# Patient Record
Sex: FEMALE | Race: BLACK OR AFRICAN AMERICAN | Employment: OTHER | URBAN - METROPOLITAN AREA
[De-identification: names, ages, dates, MRNs, and addresses within clinical notes are randomized per-mention and may not be internally consistent; named-entity substitution may affect disease eponyms.]

---

## 2019-10-07 ENCOUNTER — APPOINTMENT (OUTPATIENT)
Dept: CT IMAGING | Facility: HOSPITAL | Age: 64
DRG: 065 | End: 2019-10-07
Attending: EMERGENCY MEDICINE
Payer: MEDICARE

## 2019-10-07 ENCOUNTER — APPOINTMENT (OUTPATIENT)
Dept: MRI IMAGING | Facility: HOSPITAL | Age: 64
DRG: 065 | End: 2019-10-07
Attending: EMERGENCY MEDICINE
Payer: MEDICARE

## 2019-10-07 ENCOUNTER — HOSPITAL ENCOUNTER (INPATIENT)
Facility: HOSPITAL | Age: 64
LOS: 3 days | Discharge: NURSING, REHAB OR LONG-TERM CARE | DRG: 065 | End: 2019-10-10
Attending: EMERGENCY MEDICINE | Admitting: INTERNAL MEDICINE
Payer: MEDICARE

## 2019-10-07 DIAGNOSIS — I63.20 CEREBROVASCULAR ACCIDENT (CVA) DUE TO OCCLUSION OF PRECEREBRAL ARTERY (HCC): Primary | ICD-10-CM

## 2019-10-07 PROCEDURE — 70450 CT HEAD/BRAIN W/O DYE: CPT | Performed by: EMERGENCY MEDICINE

## 2019-10-07 PROCEDURE — 70551 MRI BRAIN STEM W/O DYE: CPT | Performed by: EMERGENCY MEDICINE

## 2019-10-07 RX ORDER — FUROSEMIDE 40 MG/1
40 TABLET ORAL DAILY
Status: DISCONTINUED | OUTPATIENT
Start: 2019-10-08 | End: 2019-10-10

## 2019-10-07 RX ORDER — DEXTROSE MONOHYDRATE 25 G/50ML
50 INJECTION, SOLUTION INTRAVENOUS AS NEEDED
Status: DISCONTINUED | OUTPATIENT
Start: 2019-10-07 | End: 2019-10-10

## 2019-10-07 RX ORDER — FUROSEMIDE 40 MG/1
40 TABLET ORAL DAILY
COMMUNITY

## 2019-10-07 RX ORDER — ERGOCALCIFEROL 1.25 MG/1
50000 CAPSULE ORAL WEEKLY
Status: DISCONTINUED | OUTPATIENT
Start: 2019-10-08 | End: 2019-10-10

## 2019-10-07 RX ORDER — ALPRAZOLAM 1 MG/1
1 TABLET ORAL NIGHTLY PRN
Status: DISCONTINUED | OUTPATIENT
Start: 2019-10-07 | End: 2019-10-10

## 2019-10-07 RX ORDER — ASPIRIN 81 MG/1
324 TABLET, CHEWABLE ORAL ONCE
Status: COMPLETED | OUTPATIENT
Start: 2019-10-07 | End: 2019-10-07

## 2019-10-07 RX ORDER — ALPRAZOLAM 1 MG/1
1 TABLET ORAL NIGHTLY PRN
COMMUNITY

## 2019-10-07 RX ORDER — CHOLECALCIFEROL (VITAMIN D3) 1250 MCG
1 CAPSULE ORAL WEEKLY
Status: ON HOLD | COMMUNITY
End: 2019-10-07 | Stop reason: CLARIF

## 2019-10-07 RX ORDER — AMLODIPINE BESYLATE 10 MG/1
10 TABLET ORAL DAILY
Status: DISCONTINUED | OUTPATIENT
Start: 2019-10-07 | End: 2019-10-10

## 2019-10-07 RX ORDER — ISOSORBIDE MONONITRATE 30 MG/1
90 TABLET, EXTENDED RELEASE ORAL DAILY
COMMUNITY

## 2019-10-07 RX ORDER — ERGOCALCIFEROL 1.25 MG/1
50000 CAPSULE ORAL WEEKLY
COMMUNITY

## 2019-10-07 RX ORDER — CARVEDILOL 25 MG/1
25 TABLET ORAL DAILY
Status: ON HOLD | COMMUNITY
End: 2019-10-10

## 2019-10-07 RX ORDER — LOSARTAN POTASSIUM 100 MG/1
TABLET ORAL DAILY
COMMUNITY

## 2019-10-07 RX ORDER — TRAMADOL HYDROCHLORIDE 50 MG/1
50 TABLET ORAL EVERY 8 HOURS PRN
Status: DISCONTINUED | OUTPATIENT
Start: 2019-10-07 | End: 2019-10-10

## 2019-10-07 RX ORDER — TRAMADOL HYDROCHLORIDE 50 MG/1
50 TABLET ORAL EVERY 8 HOURS PRN
COMMUNITY

## 2019-10-07 RX ORDER — LOSARTAN POTASSIUM 100 MG/1
100 TABLET ORAL DAILY
Status: DISCONTINUED | OUTPATIENT
Start: 2019-10-07 | End: 2019-10-10

## 2019-10-07 RX ORDER — AMLODIPINE BESYLATE 10 MG/1
10 TABLET ORAL DAILY
COMMUNITY

## 2019-10-07 RX ORDER — ASPIRIN 81 MG/1
81 TABLET, CHEWABLE ORAL DAILY
Status: DISCONTINUED | OUTPATIENT
Start: 2019-10-08 | End: 2019-10-10

## 2019-10-07 RX ORDER — CARVEDILOL 25 MG/1
25 TABLET ORAL DAILY
Status: DISCONTINUED | OUTPATIENT
Start: 2019-10-07 | End: 2019-10-10

## 2019-10-07 NOTE — ED INITIAL ASSESSMENT (HPI)
The patient reports that starting Friday she has had right arm pain, lower lip and tongue numbness, and left-sided weakness.

## 2019-10-08 ENCOUNTER — APPOINTMENT (OUTPATIENT)
Dept: CV DIAGNOSTICS | Facility: HOSPITAL | Age: 64
DRG: 065 | End: 2019-10-08
Attending: Other
Payer: MEDICARE

## 2019-10-08 ENCOUNTER — APPOINTMENT (OUTPATIENT)
Dept: ULTRASOUND IMAGING | Facility: HOSPITAL | Age: 64
DRG: 065 | End: 2019-10-08
Attending: Other
Payer: MEDICARE

## 2019-10-08 PROCEDURE — 93306 TTE W/DOPPLER COMPLETE: CPT | Performed by: OTHER

## 2019-10-08 PROCEDURE — 99223 1ST HOSP IP/OBS HIGH 75: CPT | Performed by: OTHER

## 2019-10-08 PROCEDURE — 93880 EXTRACRANIAL BILAT STUDY: CPT | Performed by: OTHER

## 2019-10-08 RX ORDER — ATORVASTATIN CALCIUM 20 MG/1
20 TABLET, FILM COATED ORAL NIGHTLY
Status: DISCONTINUED | OUTPATIENT
Start: 2019-10-08 | End: 2019-10-10

## 2019-10-08 RX ORDER — ATORVASTATIN CALCIUM 10 MG/1
10 TABLET, FILM COATED ORAL NIGHTLY
Status: DISCONTINUED | OUTPATIENT
Start: 2019-10-08 | End: 2019-10-08

## 2019-10-08 NOTE — ED PROVIDER NOTES
Patient Seen in: [facility] AND [facility] Emergency Department    History   Patient presents with:  Numbness Weakness (neurologic)    Stated Complaint: inability to ambulate, left arm pain and lower lip numbness for 3 days    HPI    Patient complains of r facia EYES: PERRLA, EOMI,  THROAT: mm dry, no lesions  NECK: supple, no meningeal signs  LUNGS: no resp distress, cta bilateral  CARDIO: RRR without murmur  GI: abdomen is soft and non tender, no masses, nl bowel sounds   EXTREMITIES: trace pedal edema,   NEUR changes           MDM   NIH Stroke Scale: 5    [email or web address].nl    TPA Discussion:  Not tpa candidate symptoms for 3 days      MDM       Admission disposition: 10/7/2019  9:36 PM         STROKE ALERT NOT [address] Redemonstration of an empty sella.    Dictated by (CST): Rhonda Jaime MD on 10/07/2019 at 21:12     Approved by (CST): Rhonda Jaime MD on 10/07/2019 at 21:16            Disposition and Plan     Clinical Impression:  Cerebrovascular accident (CVA

## 2019-10-08 NOTE — SLP NOTE
ADULT SWALLOWING EVALUATION    ASSESSMENT    ASSESSMENT/OVERALL IMPRESSION:      This BSE was ordered d/t stroke protocol. Pt alert, on 3 L/min 02 NC, afebrile and assessed sitting upright in chair (after consulting with RN).  Upon entrance to room, N Recommendations - Solids: Regular  Diet Recommendations - Liquid: Thin      Compensatory Strategies Recommended: No straws;Small bites and sips  Aspiration Precautions: Upright position; Slow rate;Small bites and sips; No straw  Medication Administration Rec cannula  Consistencies Trialed: Thin liquids; Hard solid  Method of Presentation: Self presentation;Cup;Straw  Patient Positioning: Upright;Midline;Standard chair    Oral Phase of Swallow:  Within Functional Limits       Pharyngeal Phase of Swallow: Impaired

## 2019-10-08 NOTE — CONSULTS
[facility]    PATIENT'S NAME: [facility]   ATTENDING PHYSICIAN: Santosh Chiu MD   CONSULTING PHYSICIAN: Williams Cosme MD   PATIENT ACCOUNT#:   [id number]    LOCATION:  3[facility] [address] RECORD #:   [id number]       DATE OF BIRTH: ankles. LABORATORY DATA:  Labs reveal . In the emergency room, she had a CT of the brain, revealing left thalamic ischemic stroke, new from 2015. Subsequently MRI of the brain:  Subacute left thalamic stroke.   Carotid ultrasounds:  No hemod

## 2019-10-08 NOTE — PHYSICAL THERAPY NOTE
PHYSICAL THERAPY EVALUATION - INPATIENT     Room Number: 306/306-A  Evaluation Date: 10/8/2019  Type of Evaluation: Initial   Physician Order: PT Eval and Treat    Presenting Problem: CVA  Reason for Therapy: Mobility Dysfunction and Discharge Planning weakness. Assist with management of RW with turns. 3 shuffled steps total.   Patient in chair at end of session with needs in reach. *Spoke to RN, Griffin Santos about ordering a Knee immobilizer to prevent RLE buckling in future sessions.      The patient's Appro douglas\"    PHYSICAL THERAPY EXAMINATION     OBJECTIVE  Precautions: Bed/chair alarm  Fall Risk: High fall risk    WEIGHT BEARING RESTRICTION  Weight Bearing Restriction: None                PAIN ASSESSMENT  Rating: Unable to rate  Location: R groin  Manage wheelchair)?: A Lot   -   Need to walk in hospital room?: A Lot   -   Climbing 3-5 steps with a railing?: Total     AM-PAC Score:  Raw Score: 12   Approx Degree of Impairment: 68.66%   Standardized Score (AM-PAC Scale): 35.33   CMS Modifier (G-Code): CL

## 2019-10-08 NOTE — CM/SW NOTE
09: 10AM  SW received MDO for social support and home eval.    SW spoke w/ pt in pt's room. Pt reports living w/ her dtr, [facility], and her grandson. Pt states they live in a 2 level home w/ 4-5 stairs to get into the home.  Pt reports that she remains on the

## 2019-10-08 NOTE — PLAN OF CARE
Problem: Patient Centered Care  Goal: Patient preferences are identified and integrated in the patient's plan of care  Description  Interventions:  - What would you like us to know as we care for you?   - Provide timely, complete, and accurate informatio slept well.

## 2019-10-08 NOTE — PLAN OF CARE
Problem: Patient Centered Care  Goal: Patient preferences are identified and integrated in the patient's plan of care  Description  Interventions:  - What would you like us to know as we care for you?  Home with family  - Provide timely, complete, and acc

## 2019-10-08 NOTE — CONSULTS
Spoke with the Dr. Diandra Talbert in the emergency room. Based on time she is not a TPA candidate. Based on his clinical findings she is not a neuroradiology intervention candidate. Dr. Diandra Talbert gave her aspirin. Will order stroke order sets.   We will evaluate the p

## 2019-10-08 NOTE — OCCUPATIONAL THERAPY NOTE
OCCUPATIONAL THERAPY EVALUATION - INPATIENT     Room Number: 306/306-A  Evaluation Date: 10/8/2019  Type of Evaluation: Initial  Presenting Problem: RT weakness, slurred speech    Physician Order: IP Consult to Occupational Therapy  Reason for Therapy: ADL supports that patients with this level of impairment may benefit from [address]  (nurse and social worked contacted and aware of recommendations).      DISCHARGE RECOMMENDATIONS  OT Discharge Recommendations: Acute rehabilitation  OT Device Recommendations RA  No c/o of fatigue but rather generalized weakness    O2 SATURATIONS  SPO2 on Room Air at Rest: (97 (nc out of place upon arrival- RN aware))             COGNITION  Alert and O  Follows multiple step commands    VISION  No changes    SENSATION  Continue Comment:      Comment:   Pt will perform RT UE HEP addressing strength/ROM as well as [address] and [address] [facility] for improved performace with ADLs following written in struction  Comment:    Pt will perform LE dressing with mod A 1x      Goals  on: 10/22/19  Adela Larson

## 2019-10-08 NOTE — ED NOTES
Pt reports right sided weakness since Thursday. Daughter encouraged the patient to come to ED today. Pt has slurred speech and weakness to right leg and right arm.

## 2019-10-08 NOTE — CONSULTS
.[address] Patient Status:  Inpatient    3/21/1955 MRN [id number]   Location [facility] 3W/SW Attending [address], [address] [facility] Day # 1 PCP Day Rojas MD     Patient Identification  Christina Alves is a 62 yea significant area of stenosis in the right or left internal carotid artery. Mild plaque formation bilaterally as discussed    Pending 2D Echo today. Seen by SLP. On Reg/thins.       Physiatry consult obtained now to assess pt's funtional status and make a Intramuscular Prior to discharge       Social History    Tobacco Use      Smoking status: Former Smoker        Quit date:         Years since quittin.7      Smokeless tobacco: Never Used      Tobacco comment: 0.5 pack/day for several years    [place] Musculoskeletal:     Right Upper Extremity:  Strength is 2-3. ROM WNL. Left Upper Extremity:  Strength is 5. ROM WNL. Right Lower Extremity:  Strength  is 2. ROM WNL. Left Lower Extremity: Strength  is 5. ROM WNL.           Neuro: CNII-XII are g

## 2019-10-09 ENCOUNTER — APPOINTMENT (OUTPATIENT)
Dept: CT IMAGING | Facility: HOSPITAL | Age: 64
DRG: 065 | End: 2019-10-09
Attending: Other
Payer: MEDICARE

## 2019-10-09 PROBLEM — E66.01 MORBID OBESITY (HCC): Status: ACTIVE | Noted: 2019-10-09

## 2019-10-09 PROCEDURE — 70450 CT HEAD/BRAIN W/O DYE: CPT | Performed by: OTHER

## 2019-10-09 PROCEDURE — 99233 SBSQ HOSP IP/OBS HIGH 50: CPT | Performed by: OTHER

## 2019-10-09 RX ORDER — CHOLECALCIFEROL (VITAMIN D3) 1250 MCG
1 CAPSULE ORAL WEEKLY
Qty: 1 CAPSULE | Refills: 0 | Status: SHIPPED | OUTPATIENT
Start: 2019-10-09

## 2019-10-09 RX ORDER — ASPIRIN 81 MG/1
81 TABLET, CHEWABLE ORAL DAILY
Qty: 30 TABLET | Refills: 0 | Status: SHIPPED | OUTPATIENT
Start: 2019-10-10

## 2019-10-09 RX ORDER — ATORVASTATIN CALCIUM 20 MG/1
20 TABLET, FILM COATED ORAL NIGHTLY
Qty: 30 TABLET | Refills: 0 | Status: SHIPPED | OUTPATIENT
Start: 2019-10-09

## 2019-10-09 NOTE — CDS QUERY
(Please see Dr [address] PN of 10/9 7:58 pm file time for query response)   Potential for Imbalanced Nutrition  Ar [place]    Dear Doctor [address]:  BMI  40.27   In your H&P: Type 2 diabetes mellitus, noncompliant with d

## 2019-10-09 NOTE — H&P
[facility]    PATIENT'S NAME: Terrance Srinivasan   ATTENDING PHYSICIAN: Jayme Hoyos MD   PATIENT ACCOUNT#:   [id number]    LOCATION:  [address] [place] [address] [address] RECORD #:   [id number]       YOB: 1955  ADMISSION DATE:       10/07/201 side is normal.  No carotid bruit. Has right-sided weakness with right Babinski sign. LABORATORY DATA:  Labs were reviewed. Hemoglobin A1c 8.3. Cholesterol 187, . CT of the brain showed left thalamic stroke.       MRI of the brain showed

## 2019-10-09 NOTE — DIETARY NOTE
Patient and family educated regarding diabetes diet basics. Discussed carbohydrate foods, portion sizes, and food label reading. Handout given and initial meal plan provided. Encouraged to attend outpatient diabetes education. Questions answered.  Receptiv

## 2019-10-09 NOTE — PROGRESS NOTES
Patient reported to be tired and experiencing grief over not going to hospital sooner after experiencing stroke symptoms during the weekend.   provided spiritual and emotional support and encouragment through empathic listening, salome sharing, and p

## 2019-10-09 NOTE — CM/SW NOTE
Met with patient at the bedside, to Discuss the  BPCI/Medicare program. Patient agreed with phone follow up for 3 months from [facility] after discharge from [address] [place] [address]. Patient was enrolled under [facility] BPCI/Medicare letter and brochure provided.       ERASTO/MANI

## 2019-10-09 NOTE — OCCUPATIONAL THERAPY NOTE
OCCUPATIONAL THERAPY TREATMENT NOTE - INPATIENT        Room Number: 519/825-K           Presenting Problem: RT weakness, slurred speech    Problem List  Principal Problem:    Cerebrovascular accident (CVA) due to occlusion of precerebral artery ([facility] [address]  Acti taking off regular lower body clothing?: A Lot  -   Bathing (including washing, rinsing, drying)?: A Lot  -   Toileting, which includes using toilet, bedpan or urinal? : A Lot  -   Putting on and taking off regular upper body clothing?: A Little  -   [place]

## 2019-10-09 NOTE — SLP NOTE
SPEECH DAILY NOTE - INPATIENT    ASSESSMENT & PLAN   ASSESSMENT  Pt seen for dysphagia tx to assess tolerance of recommended diet, ensure adherence to aspiration precautions, and provide pt/family education. RN approved session.  Pt received sitting up in b of Visits to Meet Established Goals: 4    Session: 1    If you have any questions, please contact Alcon Villafuerte

## 2019-10-09 NOTE — CM/SW NOTE
09: 00AM  Received [facility]O for [facility]. ERASTO sent ref to [facility] via [employer]. ERASTO also received a call from [facility] - pt is appropriate for facility. Savanna Blank coming to speak w/ pt and pt's dtr this afternoon.     10:35AM  ERASTO contacte placing pt's admission on hold til tomorrow. ERASTO arranged ambulance transport to be on will call for tomorrow 10/10. ERASTO also updated pt's dtr - no answer, left Vmail. MANI Llanes updated pt's RN.     04:25PM  ERASTO/MANI picked up disc w/ pt images from me

## 2019-10-09 NOTE — PHYSICAL THERAPY NOTE
PHYSICAL THERAPY TREATMENT NOTE - INPATIENT     Room Number: 564/089-B       Presenting Problem: CVA    Problem List  Principal Problem:    Cerebrovascular accident (CVA) due to occlusion of precerebral artery ([address]  Active Problems:     Morbid obesity ([address] motion;Strengthening;Stair training;Transfer training;Balance training    SUBJECTIVE  \"I feel discouraged\"    OBJECTIVE  Precautions: Bed/chair alarm    WEIGHT BEARING RESTRICTION  Weight Bearing Restriction: None                PAIN ASSESSMENT   Rating: 10/22/19  Patient Goal Patient's self-stated goal is: Improve mobility   Goal #1 Patient is able to demonstrate supine - sit EOB @ level: minimum assistance      Goal #1   Current Status  CG assist    Goal #2 Patient is able to demonstrate transfers EOB to

## 2019-10-09 NOTE — PROGRESS NOTES
[facility]    Progress Note    Simin Bassem Patient Status:  Inpatient    3/21/1955 MRN [id number]   [facility] 3W/SW Attending [address], [address] Day # 2 PCP Franny Ya MD       Subjective:    Samantha Shine UNIT/ML flexpen 1-7 Units 1-7 Units Subcutaneous TID CC   influenza vaccine split quad (FLULAVAL) ages 6 months to 64 years inj 0.5ml 0.5 mL Intramuscular Prior to discharge         Results:     Lab Results   Component Value Date    WBC 8.1 10/09/2019    H 4. Redemonstration of an empty sella.    Dictated by (CST): David Sarmiento MD on 10/07/2019 at 21:12     Approved by (CST): David Sarmiento MD on 10/07/2019 at 21:16          Ekg 12-lead    Result Date: 10/7/2019  ECG Report  Interpretation  --------

## 2019-10-09 NOTE — PLAN OF CARE
Problem: Patient Centered Care  Goal: Patient preferences are identified and integrated in the patient's plan of care  Description  Interventions:  - What would you like us to know as we care for you?  Home with daughter  - Provide timely, complete, and a

## 2019-10-10 VITALS
DIASTOLIC BLOOD PRESSURE: 85 MMHG | RESPIRATION RATE: 19 BRPM | SYSTOLIC BLOOD PRESSURE: 155 MMHG | OXYGEN SATURATION: 98 % | HEART RATE: 71 BPM | HEIGHT: 67 IN | BODY MASS INDEX: 39.69 KG/M2 | WEIGHT: 252.88 LBS | TEMPERATURE: 98 F

## 2019-10-10 RX ORDER — CARVEDILOL 12.5 MG/1
12.5 TABLET ORAL 2 TIMES DAILY WITH MEALS
Qty: 60 TABLET | Refills: 0 | Status: SHIPPED | OUTPATIENT
Start: 2019-10-10

## 2019-10-10 RX ORDER — CARVEDILOL 12.5 MG/1
12.5 TABLET ORAL 2 TIMES DAILY WITH MEALS
Status: DISCONTINUED | OUTPATIENT
Start: 2019-10-10 | End: 2019-10-10

## 2019-10-10 NOTE — PROGRESS NOTES
[facility]    Progress Note    Famalok Andradecatarino Patient Status:  Inpatient    3/21/1955 MRN [id number]   [facility] 3W/ Attending [address], [address] Day # 2 PCP Selena Hassan MD       Subjective:    Kira Ortiz Insulin Aspart Pen (NOVOLOG) 100 UNIT/ML flexpen 1-7 Units 1-7 Units Subcutaneous TID CC   influenza vaccine split quad (FLULAVAL) ages 6 months to 64 years inj 0.5ml 0.5 mL Intramuscular Prior to discharge       Review of Systems:   GENERAL HEALTH: feel indeterminate lacunar infarct involving the left thalamus which appears new since prior MR of the brain in October, 2015. Follow-up brain MR would be of benefit to assess chronicity of this finding. 3. Stable partially empty sella turcica.  4. Peripherally

## 2019-10-10 NOTE — CM/SW NOTE
08: 15AM  Received MDO for [facility] aid. Pt is to d/c to [id number] [address] Acute rehab today. Pending receipt of all pt's images on disc from med records. 09:05AM  ERASTO checked pt's chart - additional disc w/ pt's CT not in pt's chart.     ERASTO contacted med rec and r

## 2019-10-10 NOTE — PROGRESS NOTES
Report given to LORETTA Monet ([phone]). All questions answered. Ambulance scheduled @1551. Belongings at the bedside. Family at the bedside.

## 2019-10-10 NOTE — PLAN OF CARE
Problem: Patient Centered Care  Goal: Patient preferences are identified and integrated in the patient's plan of care  Description  Interventions:  - What would you like us to know as we care for you?  I need assistance with ADLs  - Provide timely, comple

## 2019-10-10 NOTE — PROGRESS NOTES
[facility]    Progress Note    Ambar Amanda Patient Status:  Inpatient    3/21/1955 MRN [id number]   [facility] 3W/SW Attending [address], [address] Day # 3 PCP Almita Yanes, MD       Subjective:    Kaelyn Johnston (DEX4) oral liquid 15 g 15 g Oral Q15 Min PRN   Insulin Aspart Pen (NOVOLOG) 100 UNIT/ML flexpen 1-7 Units 1-7 Units Subcutaneous TID CC   influenza vaccine split quad (FLULAVAL) ages 6 months to 64 years inj 0.5ml 0.5 mL Intramuscular Prior to discharge illness, I anticipate that, after discharge, patient will require TBD.         Ana Paula Montero MD

## 2019-10-14 NOTE — DISCHARGE SUMMARY
[facility]    PATIENT'S NAME: Raúl Whipple   ATTENDING PHYSICIAN: Violeta Antunez MD   PATIENT ACCOUNT#:   [id number]    LOCATION:  [address] [place] [address] [address] RECORD #:   [id number]       YOB: 1955  ADMISSION DATE:       10/07/2

## 2022-01-01 ENCOUNTER — APPOINTMENT (OUTPATIENT)
Dept: GENERAL RADIOLOGY | Facility: HOSPITAL | Age: 67
End: 2022-01-01
Attending: EMERGENCY MEDICINE
Payer: MEDICARE

## 2022-01-01 ENCOUNTER — HOSPITAL ENCOUNTER (EMERGENCY)
Facility: HOSPITAL | Age: 67
Discharge: HOME | End: 2022-01-01
Attending: EMERGENCY MEDICINE
Payer: MEDICARE

## 2022-01-01 VITALS
SYSTOLIC BLOOD PRESSURE: 156 MMHG | RESPIRATION RATE: 22 BRPM | BODY MASS INDEX: 39.55 KG/M2 | OXYGEN SATURATION: 95 % | HEIGHT: 67 IN | DIASTOLIC BLOOD PRESSURE: 87 MMHG | HEART RATE: 68 BPM | WEIGHT: 252 LBS | TEMPERATURE: 99 F

## 2022-01-01 DIAGNOSIS — U07.1 COVID-19 VIRUS INFECTION: Primary | ICD-10-CM

## 2022-01-01 LAB
ANION GAP SERPL CALC-SCNC: 4 MMOL/L (ref 0–18)
BASOPHILS # BLD AUTO: 0.01 X10(3) UL (ref 0–0.2)
BASOPHILS NFR BLD AUTO: 0.2 %
BUN BLD-MCNC: 9 MG/DL (ref 7–18)
BUN/CREAT SERPL: 8.9 (ref 10–20)
CALCIUM BLD-MCNC: 8.9 MG/DL (ref 8.5–10.1)
CHLORIDE SERPL-SCNC: 107 MMOL/L (ref 98–112)
CO2 SERPL-SCNC: 29 MMOL/L (ref 21–32)
CREAT BLD-MCNC: 1.01 MG/DL
D DIMER PPP FEU-MCNC: 0.66 UG/ML FEU (ref ?–0.65)
DEPRECATED RDW RBC AUTO: 47.6 FL (ref 35.1–46.3)
EOSINOPHIL # BLD AUTO: 0.08 X10(3) UL (ref 0–0.7)
EOSINOPHIL NFR BLD AUTO: 1.3 %
ERYTHROCYTE [DISTWIDTH] IN BLOOD BY AUTOMATED COUNT: 13.7 % (ref 11–15)
GLUCOSE BLD-MCNC: 132 MG/DL (ref 70–99)
HCT VFR BLD AUTO: 38.2 %
HGB BLD-MCNC: 12.2 G/DL
IMM GRANULOCYTES # BLD AUTO: 0.04 X10(3) UL (ref 0–1)
IMM GRANULOCYTES NFR BLD: 0.6 %
LYMPHOCYTES # BLD AUTO: 1.75 X10(3) UL (ref 1–4)
LYMPHOCYTES NFR BLD AUTO: 27.8 %
MCH RBC QN AUTO: 29.8 PG (ref 26–34)
MCHC RBC AUTO-ENTMCNC: 31.9 G/DL (ref 31–37)
MCV RBC AUTO: 93.4 FL
MONOCYTES # BLD AUTO: 0.68 X10(3) UL (ref 0.1–1)
MONOCYTES NFR BLD AUTO: 10.8 %
NEUTROPHILS # BLD AUTO: 3.74 X10 (3) UL (ref 1.5–7.7)
NEUTROPHILS # BLD AUTO: 3.74 X10(3) UL (ref 1.5–7.7)
NEUTROPHILS NFR BLD AUTO: 59.3 %
NT-PROBNP SERPL-MCNC: 66 PG/ML (ref ?–125)
OSMOLALITY SERPL CALC.SUM OF ELEC: 291 MOSM/KG (ref 275–295)
PLATELET # BLD AUTO: 251 10(3)UL (ref 150–450)
RBC # BLD AUTO: 4.09 X10(6)UL
SARS-COV-2 RNA RESP QL NAA+PROBE: DETECTED
SODIUM SERPL-SCNC: 140 MMOL/L (ref 136–145)
TROPONIN I HIGH SENSITIVITY: 7 NG/L
WBC # BLD AUTO: 6.3 X10(3) UL (ref 4–11)

## 2022-01-01 PROCEDURE — 85025 COMPLETE CBC W/AUTO DIFF WBC: CPT | Performed by: EMERGENCY MEDICINE

## 2022-01-01 PROCEDURE — 84484 ASSAY OF TROPONIN QUANT: CPT | Performed by: EMERGENCY MEDICINE

## 2022-01-01 PROCEDURE — 85379 FIBRIN DEGRADATION QUANT: CPT | Performed by: EMERGENCY MEDICINE

## 2022-01-01 PROCEDURE — 80048 BASIC METABOLIC PNL TOTAL CA: CPT | Performed by: EMERGENCY MEDICINE

## 2022-01-01 PROCEDURE — 93010 ELECTROCARDIOGRAM REPORT: CPT | Performed by: EMERGENCY MEDICINE

## 2022-01-01 PROCEDURE — 36415 COLL VENOUS BLD VENIPUNCTURE: CPT

## 2022-01-01 PROCEDURE — 93005 ELECTROCARDIOGRAM TRACING: CPT

## 2022-01-01 PROCEDURE — 83880 ASSAY OF NATRIURETIC PEPTIDE: CPT | Performed by: EMERGENCY MEDICINE

## 2022-01-01 PROCEDURE — 71045 X-RAY EXAM CHEST 1 VIEW: CPT | Performed by: EMERGENCY MEDICINE

## 2022-01-01 PROCEDURE — 99283 EMERGENCY DEPT VISIT LOW MDM: CPT

## 2022-01-01 RX ORDER — ALBUTEROL SULFATE 90 UG/1
2 AEROSOL, METERED RESPIRATORY (INHALATION) EVERY 4 HOURS PRN
Qty: 1 EACH | Refills: 0 | Status: SHIPPED | OUTPATIENT
Start: 2022-01-01 | End: 2022-01-31

## 2022-01-01 NOTE — ED INITIAL ASSESSMENT (HPI)
Patient presents to ER with complaints of \"uncontrollable cough\" and some chest tightness over the last 3 days. Patient having some increased shortness of breath with exertion.

## 2022-01-01 NOTE — ED PROVIDER NOTES
Patient Seen in: [facility] AND [facility] Emergency Department      History   Patient presents with:  Cough/URI  Chest Pain Angina    Stated Complaint: Cough/ Chest tightness.      Subjective:   HPI    Patient is a 18-year-old female with history of COPD and PE Normocephalic and atraumatic. Right Ear: External ear normal.   Left Ear: External ear normal.   Nose: Nose normal.   Mouth/Throat: Oropharynx is clear and moist.   Eyes: Conjunctivae and EOM are normal. Pupils are equal, round, and reactive to light. Platelet.   Procedure                               Abnormality         Status                     ---------                               -----------         ------                     CBC W/ DIFFERENTIAL[[id number]]          Abnormal            Final resul

## 2022-08-23 ENCOUNTER — HOSPITAL ENCOUNTER (EMERGENCY)
Facility: HOSPITAL | Age: 67
Discharge: HOME | End: 2022-08-23
Payer: MEDICARE

## 2022-08-23 ENCOUNTER — APPOINTMENT (OUTPATIENT)
Dept: GENERAL RADIOLOGY | Facility: HOSPITAL | Age: 67
End: 2022-08-23
Payer: MEDICARE

## 2022-08-23 VITALS
DIASTOLIC BLOOD PRESSURE: 98 MMHG | HEIGHT: 67 IN | WEIGHT: 250 LBS | OXYGEN SATURATION: 100 % | HEART RATE: 75 BPM | BODY MASS INDEX: 39.24 KG/M2 | SYSTOLIC BLOOD PRESSURE: 188 MMHG | TEMPERATURE: 98 F | RESPIRATION RATE: 20 BRPM

## 2022-08-23 DIAGNOSIS — J40 BRONCHITIS: Primary | ICD-10-CM

## 2022-08-23 LAB
ANION GAP SERPL CALC-SCNC: 6 MMOL/L (ref 0–18)
BASOPHILS # BLD AUTO: 0.02 X10(3) UL (ref 0–0.2)
BASOPHILS NFR BLD AUTO: 0.3 %
BUN BLD-MCNC: 10 MG/DL (ref 7–18)
BUN/CREAT SERPL: 11.1 (ref 10–20)
CALCIUM BLD-MCNC: 8.8 MG/DL (ref 8.5–10.1)
CHLORIDE SERPL-SCNC: 108 MMOL/L (ref 98–112)
CO2 SERPL-SCNC: 29 MMOL/L (ref 21–32)
CREAT BLD-MCNC: 0.9 MG/DL
D DIMER PPP FEU-MCNC: 0.33 UG/ML FEU (ref ?–0.66)
DEPRECATED RDW RBC AUTO: 45.4 FL (ref 35.1–46.3)
EOSINOPHIL # BLD AUTO: 0.22 X10(3) UL (ref 0–0.7)
EOSINOPHIL NFR BLD AUTO: 3.3 %
ERYTHROCYTE [DISTWIDTH] IN BLOOD BY AUTOMATED COUNT: 12.7 % (ref 11–15)
GFR SERPLBLD BASED ON 1.73 SQ M-ARVRAT: 71 ML/MIN/1.73M2 (ref 60–?)
GLUCOSE BLD-MCNC: 144 MG/DL (ref 70–99)
HCT VFR BLD AUTO: 38.5 %
HGB BLD-MCNC: 12.7 G/DL
IMM GRANULOCYTES # BLD AUTO: 0.02 X10(3) UL (ref 0–1)
IMM GRANULOCYTES NFR BLD: 0.3 %
LYMPHOCYTES # BLD AUTO: 2.02 X10(3) UL (ref 1–4)
LYMPHOCYTES NFR BLD AUTO: 30.7 %
MCH RBC QN AUTO: 32.3 PG (ref 26–34)
MCHC RBC AUTO-ENTMCNC: 33 G/DL (ref 31–37)
MCV RBC AUTO: 98 FL
MONOCYTES # BLD AUTO: 0.5 X10(3) UL (ref 0.1–1)
MONOCYTES NFR BLD AUTO: 7.6 %
NEUTROPHILS # BLD AUTO: 3.8 X10 (3) UL (ref 1.5–7.7)
NEUTROPHILS # BLD AUTO: 3.8 X10(3) UL (ref 1.5–7.7)
NEUTROPHILS NFR BLD AUTO: 57.8 %
OSMOLALITY SERPL CALC.SUM OF ELEC: 298 MOSM/KG (ref 275–295)
PLATELET # BLD AUTO: 225 10(3)UL (ref 150–450)
POTASSIUM SERPL-SCNC: 3.6 MMOL/L (ref 3.5–5.1)
RBC # BLD AUTO: 3.93 X10(6)UL
SARS-COV-2 RNA RESP QL NAA+PROBE: NOT DETECTED
SODIUM SERPL-SCNC: 143 MMOL/L (ref 136–145)
TROPONIN I HIGH SENSITIVITY: 6 NG/L
WBC # BLD AUTO: 6.6 X10(3) UL (ref 4–11)

## 2022-08-23 PROCEDURE — 71045 X-RAY EXAM CHEST 1 VIEW: CPT

## 2022-08-23 PROCEDURE — 93005 ELECTROCARDIOGRAM TRACING: CPT

## 2022-08-23 PROCEDURE — 85379 FIBRIN DEGRADATION QUANT: CPT | Performed by: EMERGENCY MEDICINE

## 2022-08-23 PROCEDURE — 93010 ELECTROCARDIOGRAM REPORT: CPT | Performed by: EMERGENCY MEDICINE

## 2022-08-23 PROCEDURE — 99284 EMERGENCY DEPT VISIT MOD MDM: CPT

## 2022-08-23 PROCEDURE — 84484 ASSAY OF TROPONIN QUANT: CPT | Performed by: EMERGENCY MEDICINE

## 2022-08-23 PROCEDURE — 80048 BASIC METABOLIC PNL TOTAL CA: CPT | Performed by: EMERGENCY MEDICINE

## 2022-08-23 PROCEDURE — 96374 THER/PROPH/DIAG INJ IV PUSH: CPT

## 2022-08-23 PROCEDURE — 85025 COMPLETE CBC W/AUTO DIFF WBC: CPT | Performed by: EMERGENCY MEDICINE

## 2022-08-23 RX ORDER — BENZONATATE 100 MG/1
100 CAPSULE ORAL 3 TIMES DAILY PRN
Qty: 30 CAPSULE | Refills: 0 | Status: SHIPPED | OUTPATIENT
Start: 2022-08-23 | End: 2022-09-22

## 2022-08-23 RX ORDER — AZITHROMYCIN 250 MG/1
TABLET, FILM COATED ORAL
Qty: 6 TABLET | Refills: 0 | Status: SHIPPED | OUTPATIENT
Start: 2022-08-23 | End: 2022-08-28

## 2022-08-23 RX ORDER — HYDRALAZINE HYDROCHLORIDE 20 MG/ML
10 INJECTION INTRAMUSCULAR; INTRAVENOUS ONCE
Status: COMPLETED | OUTPATIENT
Start: 2022-08-23 | End: 2022-08-23

## 2022-09-16 ENCOUNTER — APPOINTMENT (OUTPATIENT)
Dept: CT IMAGING | Facility: HOSPITAL | Age: 67
End: 2022-09-16
Attending: EMERGENCY MEDICINE

## 2022-09-16 ENCOUNTER — HOSPITAL ENCOUNTER (EMERGENCY)
Facility: HOSPITAL | Age: 67
Discharge: HOME | End: 2022-09-16
Attending: EMERGENCY MEDICINE

## 2022-09-16 VITALS
RESPIRATION RATE: 20 BRPM | SYSTOLIC BLOOD PRESSURE: 164 MMHG | HEIGHT: 67 IN | WEIGHT: 250 LBS | TEMPERATURE: 99 F | HEART RATE: 66 BPM | DIASTOLIC BLOOD PRESSURE: 100 MMHG | BODY MASS INDEX: 39.24 KG/M2 | OXYGEN SATURATION: 92 %

## 2022-09-16 DIAGNOSIS — R51.9 ACUTE NONINTRACTABLE HEADACHE, UNSPECIFIED HEADACHE TYPE: ICD-10-CM

## 2022-09-16 DIAGNOSIS — I10 UNCONTROLLED HYPERTENSION: Primary | ICD-10-CM

## 2022-09-16 LAB
ANION GAP SERPL CALC-SCNC: 3 MMOL/L (ref 0–18)
BASOPHILS # BLD AUTO: 0.03 X10(3) UL (ref 0–0.2)
BASOPHILS NFR BLD AUTO: 0.4 %
BUN BLD-MCNC: 14 MG/DL (ref 7–18)
BUN/CREAT SERPL: 16.7 (ref 10–20)
CALCIUM BLD-MCNC: 8.9 MG/DL (ref 8.5–10.1)
CHLORIDE SERPL-SCNC: 112 MMOL/L (ref 98–112)
CO2 SERPL-SCNC: 28 MMOL/L (ref 21–32)
CREAT BLD-MCNC: 0.84 MG/DL
DEPRECATED RDW RBC AUTO: 46.6 FL (ref 35.1–46.3)
EOSINOPHIL # BLD AUTO: 0.27 X10(3) UL (ref 0–0.7)
EOSINOPHIL NFR BLD AUTO: 4 %
ERYTHROCYTE [DISTWIDTH] IN BLOOD BY AUTOMATED COUNT: 13.1 % (ref 11–15)
GFR SERPLBLD BASED ON 1.73 SQ M-ARVRAT: 77 ML/MIN/1.73M2 (ref 60–?)
GLUCOSE BLD-MCNC: 165 MG/DL (ref 70–99)
HCT VFR BLD AUTO: 40.1 %
HGB BLD-MCNC: 13 G/DL
IMM GRANULOCYTES # BLD AUTO: 0.04 X10(3) UL (ref 0–1)
IMM GRANULOCYTES NFR BLD: 0.6 %
LYMPHOCYTES # BLD AUTO: 2.48 X10(3) UL (ref 1–4)
LYMPHOCYTES NFR BLD AUTO: 36.7 %
MCH RBC QN AUTO: 31.8 PG (ref 26–34)
MCHC RBC AUTO-ENTMCNC: 32.4 G/DL (ref 31–37)
MCV RBC AUTO: 98 FL
MONOCYTES # BLD AUTO: 0.56 X10(3) UL (ref 0.1–1)
MONOCYTES NFR BLD AUTO: 8.3 %
NEUTROPHILS # BLD AUTO: 3.38 X10 (3) UL (ref 1.5–7.7)
NEUTROPHILS # BLD AUTO: 3.38 X10(3) UL (ref 1.5–7.7)
NEUTROPHILS NFR BLD AUTO: 50 %
OSMOLALITY SERPL CALC.SUM OF ELEC: 300 MOSM/KG (ref 275–295)
PLATELET # BLD AUTO: 235 10(3)UL (ref 150–450)
POTASSIUM SERPL-SCNC: 4.1 MMOL/L (ref 3.5–5.1)
RBC # BLD AUTO: 4.09 X10(6)UL
SODIUM SERPL-SCNC: 143 MMOL/L (ref 136–145)
WBC # BLD AUTO: 6.8 X10(3) UL (ref 4–11)

## 2022-09-16 PROCEDURE — 70450 CT HEAD/BRAIN W/O DYE: CPT | Performed by: EMERGENCY MEDICINE

## 2022-09-16 PROCEDURE — 96375 TX/PRO/DX INJ NEW DRUG ADDON: CPT

## 2022-09-16 PROCEDURE — 85025 COMPLETE CBC W/AUTO DIFF WBC: CPT | Performed by: EMERGENCY MEDICINE

## 2022-09-16 PROCEDURE — 93010 ELECTROCARDIOGRAM REPORT: CPT | Performed by: INTERNAL MEDICINE

## 2022-09-16 PROCEDURE — 96374 THER/PROPH/DIAG INJ IV PUSH: CPT

## 2022-09-16 PROCEDURE — 99284 EMERGENCY DEPT VISIT MOD MDM: CPT

## 2022-09-16 PROCEDURE — 93005 ELECTROCARDIOGRAM TRACING: CPT

## 2022-09-16 PROCEDURE — 80048 BASIC METABOLIC PNL TOTAL CA: CPT | Performed by: EMERGENCY MEDICINE

## 2022-09-16 RX ORDER — BUTALBITAL, ACETAMINOPHEN AND CAFFEINE 50; 325; 40 MG/1; MG/1; MG/1
1-2 TABLET ORAL EVERY 4 HOURS PRN
Qty: 20 TABLET | Refills: 0 | Status: SHIPPED | OUTPATIENT
Start: 2022-09-16

## 2022-09-16 RX ORDER — GLIPIZIDE 10 MG/1
10 TABLET ORAL
COMMUNITY
Start: 2022-08-11

## 2022-09-16 RX ORDER — METOPROLOL TARTRATE 5 MG/5ML
5 INJECTION INTRAVENOUS ONCE
Status: COMPLETED | OUTPATIENT
Start: 2022-09-16 | End: 2022-09-16

## 2022-09-16 RX ORDER — METOPROLOL SUCCINATE 50 MG/1
1 TABLET, EXTENDED RELEASE ORAL DAILY
COMMUNITY
Start: 2022-09-15

## 2022-09-16 RX ORDER — HYDRALAZINE HYDROCHLORIDE 10 MG/1
10 TABLET, FILM COATED ORAL 3 TIMES DAILY
Qty: 90 TABLET | Refills: 1 | Status: SHIPPED | OUTPATIENT
Start: 2022-09-16 | End: 2022-10-16

## 2022-09-16 RX ORDER — KETOROLAC TROMETHAMINE 15 MG/ML
15 INJECTION, SOLUTION INTRAMUSCULAR; INTRAVENOUS ONCE
Status: COMPLETED | OUTPATIENT
Start: 2022-09-16 | End: 2022-09-16

## 2022-09-16 RX ORDER — DIPHENHYDRAMINE HYDROCHLORIDE 50 MG/ML
25 INJECTION INTRAMUSCULAR; INTRAVENOUS ONCE
Status: COMPLETED | OUTPATIENT
Start: 2022-09-16 | End: 2022-09-16

## 2022-09-16 NOTE — ED QUICK NOTES
Pt reports dizziness, lightheadedness, shortness of breath, and headaches for approx 3 days. Weakness and blurred vision started this am. Pt also reports a \"tingling\" sensation in her lips that started last night. Pt states that blood pressure has been \"really high\" for the last few days and her home care doctor advised her to go to the ER. Pt has hx of stroke in 2021.

## 2022-09-16 NOTE — ED INITIAL ASSESSMENT (HPI)
Pt from home with family with complaint of headaches, dizziness, lightheadedness x 3 days with generalized weakness and blurry vision in the right eye.

## 2022-11-07 ENCOUNTER — HOSPITAL ENCOUNTER (EMERGENCY)
Facility: HOSPITAL | Age: 67
Discharge: HOME | End: 2022-11-08
Attending: EMERGENCY MEDICINE
Payer: MEDICARE

## 2022-11-07 ENCOUNTER — APPOINTMENT (OUTPATIENT)
Dept: CT IMAGING | Facility: HOSPITAL | Age: 67
End: 2022-11-07
Attending: EMERGENCY MEDICINE
Payer: MEDICARE

## 2022-11-07 ENCOUNTER — APPOINTMENT (OUTPATIENT)
Dept: GENERAL RADIOLOGY | Facility: HOSPITAL | Age: 67
End: 2022-11-07
Payer: MEDICARE

## 2022-11-07 DIAGNOSIS — S99.912A INJURY OF LEFT ANKLE, INITIAL ENCOUNTER: ICD-10-CM

## 2022-11-07 DIAGNOSIS — S89.92XA INJURY OF LEFT KNEE, INITIAL ENCOUNTER: Primary | ICD-10-CM

## 2022-11-07 PROCEDURE — 73590 X-RAY EXAM OF LOWER LEG: CPT

## 2022-11-07 PROCEDURE — 73610 X-RAY EXAM OF ANKLE: CPT

## 2022-11-07 PROCEDURE — 99284 EMERGENCY DEPT VISIT MOD MDM: CPT

## 2022-11-07 PROCEDURE — 70450 CT HEAD/BRAIN W/O DYE: CPT | Performed by: EMERGENCY MEDICINE

## 2022-11-07 PROCEDURE — 73560 X-RAY EXAM OF KNEE 1 OR 2: CPT

## 2022-11-07 RX ORDER — TRAMADOL HYDROCHLORIDE 50 MG/1
50 TABLET ORAL ONCE
Status: COMPLETED | OUTPATIENT
Start: 2022-11-07 | End: 2022-11-07

## 2022-11-08 VITALS
HEART RATE: 91 BPM | BODY MASS INDEX: 38.14 KG/M2 | DIASTOLIC BLOOD PRESSURE: 80 MMHG | HEIGHT: 67 IN | RESPIRATION RATE: 18 BRPM | TEMPERATURE: 98 F | SYSTOLIC BLOOD PRESSURE: 145 MMHG | WEIGHT: 243 LBS | OXYGEN SATURATION: 97 %

## 2022-11-08 RX ORDER — TRAMADOL HYDROCHLORIDE 50 MG/1
50 TABLET ORAL EVERY 6 HOURS PRN
Qty: 10 TABLET | Refills: 0 | Status: SHIPPED | OUTPATIENT
Start: 2022-11-08 | End: 2022-11-13

## 2022-11-08 NOTE — ED INITIAL ASSESSMENT (HPI)
Patient to ER from home with c/o fall down stairs about 30 minutes prior to arrival. Patient landed on concrete. Denies head injury or LOC. Patient c/o left knee and leg pain.

## 2022-11-08 NOTE — ED QUICK NOTES
Pt reports her \"leg gave out\" when walking down stairs tonight, she fell on L side down 3 stairs. Pt c/o pain to L knee. PT on eliquis. Pt reports she has has multiple falls since a brain biopsy. Pt reports falling last week and hitting head. Denies head/neck/back pain at this time, family at bedside.

## 2024-01-25 ENCOUNTER — HOSPITAL ENCOUNTER (EMERGENCY)
Facility: HOSPITAL | Age: 69
Discharge: HOME | End: 2024-01-25
Attending: EMERGENCY MEDICINE
Payer: MEDICARE

## 2024-01-25 ENCOUNTER — APPOINTMENT (OUTPATIENT)
Dept: CT IMAGING | Facility: HOSPITAL | Age: 69
End: 2024-01-25
Attending: EMERGENCY MEDICINE
Payer: MEDICARE

## 2024-01-25 VITALS
RESPIRATION RATE: 18 BRPM | TEMPERATURE: 98 F | OXYGEN SATURATION: 98 % | HEART RATE: 69 BPM | SYSTOLIC BLOOD PRESSURE: 141 MMHG | DIASTOLIC BLOOD PRESSURE: 83 MMHG

## 2024-01-25 DIAGNOSIS — R10.31 ABDOMINAL PAIN, RIGHT LOWER QUADRANT: Primary | ICD-10-CM

## 2024-01-25 LAB
ALBUMIN SERPL-MCNC: 4.5 G/DL (ref 3.2–4.8)
ALBUMIN/GLOB SERPL: 1.2 {RATIO} (ref 1–2)
ALP LIVER SERPL-CCNC: 83 U/L
ALT SERPL-CCNC: 15 U/L
ANION GAP SERPL CALC-SCNC: 9 MMOL/L (ref 0–18)
AST SERPL-CCNC: 29 U/L (ref ?–34)
BASOPHILS # BLD AUTO: 0.03 X10(3) UL (ref 0–0.2)
BASOPHILS NFR BLD AUTO: 0.4 %
BILIRUB SERPL-MCNC: 0.6 MG/DL (ref 0.2–1.1)
BUN BLD-MCNC: 16 MG/DL (ref 9–23)
BUN/CREAT SERPL: 17.2 (ref 10–20)
CALCIUM BLD-MCNC: 9.7 MG/DL (ref 8.7–10.4)
CHLORIDE SERPL-SCNC: 105 MMOL/L (ref 98–112)
CO2 SERPL-SCNC: 24 MMOL/L (ref 21–32)
CREAT BLD-MCNC: 0.93 MG/DL
DEPRECATED RDW RBC AUTO: 43.6 FL (ref 35.1–46.3)
EGFRCR SERPLBLD CKD-EPI 2021: 67 ML/MIN/1.73M2 (ref 60–?)
EOSINOPHIL # BLD AUTO: 0.25 X10(3) UL (ref 0–0.7)
EOSINOPHIL NFR BLD AUTO: 3.5 %
ERYTHROCYTE [DISTWIDTH] IN BLOOD BY AUTOMATED COUNT: 12.6 % (ref 11–15)
GLOBULIN PLAS-MCNC: 3.9 G/DL (ref 2.8–4.4)
GLUCOSE BLD-MCNC: 183 MG/DL (ref 70–99)
HCT VFR BLD AUTO: 40.9 %
HGB BLD-MCNC: 14.1 G/DL
IMM GRANULOCYTES # BLD AUTO: 0.04 X10(3) UL (ref 0–1)
IMM GRANULOCYTES NFR BLD: 0.6 %
LIPASE SERPL-CCNC: 38 U/L (ref 13–75)
LYMPHOCYTES # BLD AUTO: 1.98 X10(3) UL (ref 1–4)
LYMPHOCYTES NFR BLD AUTO: 27.9 %
MCH RBC QN AUTO: 32.3 PG (ref 26–34)
MCHC RBC AUTO-ENTMCNC: 34.5 G/DL (ref 31–37)
MCV RBC AUTO: 93.8 FL
MONOCYTES # BLD AUTO: 0.82 X10(3) UL (ref 0.1–1)
MONOCYTES NFR BLD AUTO: 11.6 %
NEUTROPHILS # BLD AUTO: 3.97 X10 (3) UL (ref 1.5–7.7)
NEUTROPHILS # BLD AUTO: 3.97 X10(3) UL (ref 1.5–7.7)
NEUTROPHILS NFR BLD AUTO: 56 %
OSMOLALITY SERPL CALC.SUM OF ELEC: 292 MOSM/KG (ref 275–295)
PLATELET # BLD AUTO: 259 10(3)UL (ref 150–450)
PROT SERPL-MCNC: 8.4 G/DL (ref 5.7–8.2)
RBC # BLD AUTO: 4.36 X10(6)UL
SODIUM SERPL-SCNC: 138 MMOL/L (ref 136–145)
WBC # BLD AUTO: 7.1 X10(3) UL (ref 4–11)

## 2024-01-25 PROCEDURE — 80053 COMPREHEN METABOLIC PANEL: CPT

## 2024-01-25 PROCEDURE — 85025 COMPLETE CBC W/AUTO DIFF WBC: CPT

## 2024-01-25 PROCEDURE — 83690 ASSAY OF LIPASE: CPT | Performed by: EMERGENCY MEDICINE

## 2024-01-25 PROCEDURE — 96374 THER/PROPH/DIAG INJ IV PUSH: CPT

## 2024-01-25 PROCEDURE — 74176 CT ABD & PELVIS W/O CONTRAST: CPT | Performed by: EMERGENCY MEDICINE

## 2024-01-25 PROCEDURE — 80053 COMPREHEN METABOLIC PANEL: CPT | Performed by: EMERGENCY MEDICINE

## 2024-01-25 PROCEDURE — 85025 COMPLETE CBC W/AUTO DIFF WBC: CPT | Performed by: EMERGENCY MEDICINE

## 2024-01-25 PROCEDURE — 83690 ASSAY OF LIPASE: CPT

## 2024-01-25 PROCEDURE — 99285 EMERGENCY DEPT VISIT HI MDM: CPT

## 2024-01-25 PROCEDURE — 99284 EMERGENCY DEPT VISIT MOD MDM: CPT

## 2024-01-25 RX ORDER — DICYCLOMINE HCL 20 MG
20 TABLET ORAL 4 TIMES DAILY PRN
Qty: 30 TABLET | Refills: 0 | Status: SHIPPED | OUTPATIENT
Start: 2024-01-25

## 2024-01-25 RX ORDER — KETOROLAC TROMETHAMINE 15 MG/ML
15 INJECTION, SOLUTION INTRAMUSCULAR; INTRAVENOUS ONCE
Status: COMPLETED | OUTPATIENT
Start: 2024-01-25 | End: 2024-01-25

## 2024-01-25 NOTE — ED INITIAL ASSESSMENT (HPI)
Pt presents to ed with c/o RLQ pain x few weeks. Pt states the pain radiates to her back. Reports nausea. Aleve and advil at 11am and 2pm with no relief. Pt states she came in to ED d/t pain not resolving    Denies  pain with urinartion, vomiting/diarrhea, or fevers.

## 2024-01-26 NOTE — ED QUICK NOTES
Patient provided with discharge instructions. Verbalized understanding for plan of care at home and follow up. All question and concerns addressed prior to discharge. Iv removed, catheter intact. Let pt know rx was sent to pharmacy.

## 2024-01-26 NOTE — ED PROVIDER NOTES
Patient Seen in: [facility] Emergency Department    History     Chief Complaint   Patient presents with    Abdomen/Flank Pain       HPI    The patient presents to the ED complaining of right lower quadrant abdominal pain for the past 2 weeks.  She states the pain radiates up her back slightly on the right side.  Associated nausea without vomiting.  Denies other complaints.  Pain has been ongoing without relief.    History reviewed.   Past Medical History:   Diagnosis Date    COPD (chronic obstructive pulmonary disease) (HCC)     Diabetes (HCC)     Essential hypertension     Pulmonary embolism (HCC)     Stroke (HCC)        History reviewed.   Past Surgical History:   Procedure Laterality Date    REMOVAL GALLBLADDER      TOTAL ABDOM HYSTERECTOMY           Medications :  (Not in a hospital admission)       No family history on file.    Smoking Status:   Social History     Socioeconomic History    Marital status: Single   Tobacco Use    Smoking status: Former     Types: Cigarettes     Quit date:      Years since quittin.0    Smokeless tobacco: Never    Tobacco comments:     0.5 pack/day for several years   Vaping Use    Vaping Use: Never used   Substance and Sexual Activity    Alcohol use: Not Currently    Drug use: Never       Constitutional and vital signs reviewed.      Social History and Family History elements reviewed from today, pertinent positives to the presenting problem noted.    Physical Exam     ED Triage Vitals [24 1735]   BP (!) 153/93   Pulse 79   Resp 20   Temp 98.4 °F (36.9 °C)   Temp src Temporal   SpO2 96 %   O2 Device None (Room air)       All measures to prevent infection transmission during my interaction with the patient were taken. The patient was already wearing a droplet mask on my arrival to the room. Personal protective equipment was worn throughout the duration of the exam.  Handwashing was performed prior to and after the exam.  Stethoscope and any equipment used  during my examination was cleaned with super sani-cloth germicidal wipes following the exam.     Physical Exam  Vitals and nursing note reviewed.   Constitutional:       General: She is not in acute distress.     Appearance: She is obese.   HENT:      Head: Normocephalic and atraumatic.   Eyes:      General:         Right eye: No discharge.         Left eye: No discharge.      Conjunctiva/sclera: Conjunctivae normal.   Neck:      Trachea: No tracheal deviation.   Cardiovascular:      Rate and Rhythm: Normal rate.   Pulmonary:      Effort: Pulmonary effort is normal. No respiratory distress.      Breath sounds: No stridor.   Abdominal:      General: There is no distension.      Palpations: Abdomen is soft.      Tenderness: There is abdominal tenderness in the right lower quadrant. There is no guarding or rebound.   Musculoskeletal:         General: No deformity.   Skin:     General: Skin is warm and dry.   Neurological:      Mental Status: She is alert and oriented to person, place, and time.   Psychiatric:         Mood and Affect: Mood normal.         Behavior: Behavior normal.         ED Course        Labs Reviewed   COMP METABOLIC PANEL (14) - Abnormal; Notable for the following components:       Result Value    Glucose 183 (*)     Total Protein 8.4 (*)     All other components within normal limits   LIPASE - Normal   CBC WITH DIFFERENTIAL WITH PLATELET    Narrative:     The following orders were created for panel order CBC With Differential With Platelet.                  Procedure                               Abnormality         Status                                     ---------                               -----------         ------                                     CBC W/ DIFFERENTIAL[[id number]]                              Final result                                                 Please view results for these tests on the individual orders.   CBC W/ DIFFERENTIAL       As Interpreted by me    Imaging  Results Available and Reviewed while in ED: CT ABDOMEN+PELVIS(CPT=74176)    Result Date: 1/25/2024  CONCLUSION:  1. No acute intra-abdominal process. 2. Mild uncomplicated sigmoid colonic diverticulosis. 3. Moderate colonic stool burden.  Correlate clinically for a history of constipation. 4. Status post hysterectomy and cholecystectomy.  No biliary ductal dilatation. 5. Multi-vessel coronary atherosclerosis. 6. Lesser incidental findings as above.    Dictated by (CST): Brice Pitt MD on 1/25/2024 at 7:41 PM     Finalized by (CST): Brice Pitt MD on 1/25/2024 at 7:47 PM         ED Medications Administered:   Medications   ketorolac (Toradol) 15 MG/ML injection 15 mg (15 mg Intravenous Given 1/25/24 1904)         MDM     Vitals:    01/25/24 1735 01/25/24 1820 01/25/24 2038   BP: (!) 153/93 (!) 155/93 141/83   Pulse: 79 71 69   Resp: 20  18   Temp: 98.4 °F (36.9 °C)     TempSrc: Temporal     SpO2: 96% 96% 98%     *I personally reviewed and interpreted all ED vitals.    Pulse Ox: 98%, Room air, Normal       Differential Diagnosis/ Diagnostic Considerations: Acute appendicitis, IBS, mesenteric adenitis, muscle strain, ureterolithiasis, other    Complicating Factors: The patient already has does not have any pertinent problems on file. to contribute to the complexity of this ED evaluation.    Medical Decision Making  The patient presents to the ED with now several weeks of pain in her right lower quadrant.  Tenderness in this area but abdomen benign on exam.  Laboratory testing without concerning findings.  CT obtained which shows no acute findings.  Patient feeling better in the ED with medication and I recommended outpatient follow-up with GI for further evaluation.  Will discharge home with pain control and recommended return precautions.    Problems Addressed:  Abdominal pain, right lower quadrant: acute illness or injury that poses a threat to life or bodily functions    Amount and/or Complexity of  Data Reviewed  Labs: ordered. Decision-making details documented in ED Course.  Radiology: ordered and independent interpretation performed. Decision-making details documented in ED Course.     Details: I personally reviewed the patient's CT abdomen pelvis and noted no free fluid or SBO    Risk  Prescription drug management.        Condition upon leaving the department: Stable    Disposition and Plan     Clinical Impression:  1. Abdominal pain, right lower quadrant        Disposition:  Discharge    Follow-up:  Vera Calderon MD  [address] [facility] [address] [phone]    Schedule an appointment as soon as possible for a visit in 3 day(s)      Link Davis MD  [address] [place] [id number] [phone]    Schedule an appointment as soon as possible for a visit in 1 week(s)        Medications Prescribed:  Discharge Medication List as of 1/25/2024  8:27 PM        START taking these medications    Details   dicyclomine 20 MG Oral Tab Take 1 tablet (20 mg total) by mouth 4 (four) times daily as needed (Abdominal pain)., Normal, Disp-30 tablet, R-0

## 2024-10-05 ENCOUNTER — HOSPITAL ENCOUNTER (EMERGENCY)
Facility: HOSPITAL | Age: 69
Discharge: HOME | End: 2024-10-05
Attending: EMERGENCY MEDICINE
Payer: MEDICARE

## 2024-10-05 ENCOUNTER — APPOINTMENT (OUTPATIENT)
Dept: GENERAL RADIOLOGY | Facility: HOSPITAL | Age: 69
End: 2024-10-05
Attending: EMERGENCY MEDICINE
Payer: MEDICARE

## 2024-10-05 VITALS
SYSTOLIC BLOOD PRESSURE: 135 MMHG | DIASTOLIC BLOOD PRESSURE: 106 MMHG | BODY MASS INDEX: 37.2 KG/M2 | OXYGEN SATURATION: 97 % | TEMPERATURE: 99 F | RESPIRATION RATE: 18 BRPM | HEIGHT: 67 IN | HEART RATE: 76 BPM | WEIGHT: 237 LBS

## 2024-10-05 DIAGNOSIS — J44.1 COPD EXACERBATION (HCC): Primary | ICD-10-CM

## 2024-10-05 LAB
ANION GAP SERPL CALC-SCNC: 5 MMOL/L (ref 0–18)
ATRIAL RATE: 76 BPM
BASOPHILS # BLD AUTO: 0.04 X10(3) UL (ref 0–0.2)
BASOPHILS NFR BLD AUTO: 0.7 %
BILIRUB UR QL: NEGATIVE
BUN BLD-MCNC: 13 MG/DL (ref 9–23)
BUN/CREAT SERPL: 14.4 (ref 10–20)
CALCIUM BLD-MCNC: 9.8 MG/DL (ref 8.7–10.4)
CHLORIDE SERPL-SCNC: 110 MMOL/L (ref 98–112)
CLARITY UR: CLEAR
CO2 SERPL-SCNC: 28 MMOL/L (ref 21–32)
COLOR UR: YELLOW
CREAT BLD-MCNC: 0.9 MG/DL
D DIMER PPP FEU-MCNC: 0.41 UG/ML FEU (ref ?–0.68)
DEPRECATED RDW RBC AUTO: 42.5 FL (ref 35.1–46.3)
EGFRCR SERPLBLD CKD-EPI 2021: 70 ML/MIN/1.73M2 (ref 60–?)
EOSINOPHIL # BLD AUTO: 0.15 X10(3) UL (ref 0–0.7)
EOSINOPHIL NFR BLD AUTO: 2.5 %
ERYTHROCYTE [DISTWIDTH] IN BLOOD BY AUTOMATED COUNT: 12.2 % (ref 11–15)
FLUAV + FLUBV RNA SPEC NAA+PROBE: NEGATIVE
FLUAV + FLUBV RNA SPEC NAA+PROBE: NEGATIVE
GLUCOSE BLD-MCNC: 207 MG/DL (ref 70–99)
GLUCOSE UR-MCNC: NORMAL MG/DL
HCT VFR BLD AUTO: 37.7 %
HGB BLD-MCNC: 13.1 G/DL
HGB UR QL STRIP.AUTO: NEGATIVE
IMM GRANULOCYTES # BLD AUTO: 0.04 X10(3) UL (ref 0–1)
IMM GRANULOCYTES NFR BLD: 0.7 %
KETONES UR-MCNC: NEGATIVE MG/DL
LEUKOCYTE ESTERASE UR QL STRIP.AUTO: NEGATIVE
LYMPHOCYTES # BLD AUTO: 2.05 X10(3) UL (ref 1–4)
LYMPHOCYTES NFR BLD AUTO: 34.2 %
MCH RBC QN AUTO: 32.8 PG (ref 26–34)
MCHC RBC AUTO-ENTMCNC: 34.7 G/DL (ref 31–37)
MCV RBC AUTO: 94.5 FL
MONOCYTES # BLD AUTO: 0.51 X10(3) UL (ref 0.1–1)
MONOCYTES NFR BLD AUTO: 8.5 %
NEUTROPHILS # BLD AUTO: 3.21 X10 (3) UL (ref 1.5–7.7)
NEUTROPHILS # BLD AUTO: 3.21 X10(3) UL (ref 1.5–7.7)
NEUTROPHILS NFR BLD AUTO: 53.4 %
NITRITE UR QL STRIP.AUTO: NEGATIVE
NT-PROBNP SERPL-MCNC: 63 PG/ML (ref ?–125)
OSMOLALITY SERPL CALC.SUM OF ELEC: 302 MOSM/KG (ref 275–295)
P AXIS: 50 DEGREES
P-R INTERVAL: 168 MS
PH UR: 5 [PH] (ref 5–8)
PLATELET # BLD AUTO: 231 10(3)UL (ref 150–450)
POTASSIUM SERPL-SCNC: 4.4 MMOL/L (ref 3.5–5.1)
PROT UR-MCNC: NEGATIVE MG/DL
Q-T INTERVAL: 376 MS
QRS DURATION: 108 MS
QTC CALCULATION (BEZET): 423 MS
R AXIS: 37 DEGREES
RBC # BLD AUTO: 3.99 X10(6)UL
RSV RNA SPEC NAA+PROBE: NEGATIVE
SARS-COV-2 RNA RESP QL NAA+PROBE: NOT DETECTED
SODIUM SERPL-SCNC: 143 MMOL/L (ref 136–145)
SP GR UR STRIP: 1.02 (ref 1–1.03)
T AXIS: 26 DEGREES
TROPONIN I SERPL HS-MCNC: 4 NG/L
UROBILINOGEN UR STRIP-ACNC: 2
VENTRICULAR RATE: 76 BPM
WBC # BLD AUTO: 6 X10(3) UL (ref 4–11)

## 2024-10-05 PROCEDURE — 99284 EMERGENCY DEPT VISIT MOD MDM: CPT

## 2024-10-05 PROCEDURE — 85379 FIBRIN DEGRADATION QUANT: CPT | Performed by: EMERGENCY MEDICINE

## 2024-10-05 PROCEDURE — 81003 URINALYSIS AUTO W/O SCOPE: CPT | Performed by: EMERGENCY MEDICINE

## 2024-10-05 PROCEDURE — 94640 AIRWAY INHALATION TREATMENT: CPT

## 2024-10-05 PROCEDURE — 84484 ASSAY OF TROPONIN QUANT: CPT | Performed by: EMERGENCY MEDICINE

## 2024-10-05 PROCEDURE — 99285 EMERGENCY DEPT VISIT HI MDM: CPT

## 2024-10-05 PROCEDURE — 85025 COMPLETE CBC W/AUTO DIFF WBC: CPT | Performed by: EMERGENCY MEDICINE

## 2024-10-05 PROCEDURE — 71045 X-RAY EXAM CHEST 1 VIEW: CPT | Performed by: EMERGENCY MEDICINE

## 2024-10-05 PROCEDURE — 93010 ELECTROCARDIOGRAM REPORT: CPT

## 2024-10-05 PROCEDURE — 93005 ELECTROCARDIOGRAM TRACING: CPT

## 2024-10-05 PROCEDURE — 83880 ASSAY OF NATRIURETIC PEPTIDE: CPT | Performed by: EMERGENCY MEDICINE

## 2024-10-05 PROCEDURE — 0241U SARS-COV-2/FLU A AND B/RSV BY PCR (GENEXPERT): CPT | Performed by: EMERGENCY MEDICINE

## 2024-10-05 PROCEDURE — 80048 BASIC METABOLIC PNL TOTAL CA: CPT | Performed by: EMERGENCY MEDICINE

## 2024-10-05 PROCEDURE — 96374 THER/PROPH/DIAG INJ IV PUSH: CPT

## 2024-10-05 RX ORDER — IPRATROPIUM BROMIDE AND ALBUTEROL SULFATE 2.5; .5 MG/3ML; MG/3ML
3 SOLUTION RESPIRATORY (INHALATION) ONCE
Status: COMPLETED | OUTPATIENT
Start: 2024-10-05 | End: 2024-10-05

## 2024-10-05 RX ORDER — PREDNISONE 20 MG/1
40 TABLET ORAL DAILY
Qty: 10 TABLET | Refills: 0 | Status: SHIPPED | OUTPATIENT
Start: 2024-10-05 | End: 2024-10-10

## 2024-10-05 RX ORDER — METHYLPREDNISOLONE SODIUM SUCCINATE 125 MG/2ML
125 INJECTION, POWDER, LYOPHILIZED, FOR SOLUTION INTRAMUSCULAR; INTRAVENOUS ONCE
Status: COMPLETED | OUTPATIENT
Start: 2024-10-05 | End: 2024-10-05

## 2024-10-05 RX ORDER — ALBUTEROL SULFATE 90 UG/1
2 INHALANT RESPIRATORY (INHALATION) EVERY 4 HOURS PRN
Qty: 1 EACH | Refills: 0 | Status: SHIPPED | OUTPATIENT
Start: 2024-10-05 | End: 2024-11-04

## 2024-10-05 NOTE — ED INITIAL ASSESSMENT (HPI)
Pt ambulatory through triage c/o cough and abrahan w/ chest pain and tightness starting ~1 week ago.     Hx copd. Pt uses 2-3L O2 as needed.

## 2024-10-05 NOTE — ED PROVIDER NOTES
Patient Seen in: [facility] Emergency Department      History     Chief Complaint   Patient presents with    Difficulty Breathing    Chest Pain Angina     Stated Complaint: cough/fatigue    Subjective:   HPI    69-year-old female with COPD, CAD, diabetes, PE, stroke, who presents for evaluation of chest pain, dyspnea and wheezing.  Symptoms started 2 to 3 days ago.  No measured fevers.  No swelling of the legs.  She was wheezing at home and use her albuterol prior to arrival.  She also has some chest tightness.  No hemoptysis.  Additionally her family member at bedside reports that she has some right-sided low back pain.  No pain at the anterior abdomen.  No dysuria or hematuria.  Patient reports that she has had this pain since the beginning of the year.    Objective:     Past Medical History:    COPD (chronic obstructive pulmonary disease) (HCC)    Diabetes (HCC)    Essential hypertension    Pulmonary embolism (HCC)    Stroke (HCC)              Past Surgical History:   Procedure Laterality Date    Removal gallbladder      Total abdom hysterectomy                  Social History     Socioeconomic History    Marital status: Single   Tobacco Use    Smoking status: Former     Current packs/day: 0.00     Types: Cigarettes     Quit date:      Years since quittin.7    Smokeless tobacco: Never    Tobacco comments:     0.5 pack/day for several years   Vaping Use    Vaping status: Never Used   Substance and Sexual Activity    Alcohol use: Not Currently    Drug use: Never     Social Determinants of Health     Financial Resource Strain: Low Risk  (10/1/2024)    Received from [facility]    Overall Financial Resource Strain (CARDIA)     Difficulty of Paying Living Expenses: Not hard at all   Food Insecurity: Food Insecurity Present (10/1/2024)    Received from [facility]    Hunger Vital Sign     Worried About Running Out of Food in the Last Year: Sometimes true      Ran Out of Food in the Last Year: Sometimes true   Transportation Needs: No Transportation Needs (10/1/2024)    Received from [facility]    PRAPARE - Transportation     Lack of Transportation (Medical): No     Lack of Transportation (Non-Medical): No   Physical Activity: Sufficiently Active (7/17/2024)    Received from [facility]    Exercise Vital Sign     Days of Exercise per Week: 6 days     Minutes of Exercise per Session: 60 min   Stress: Stress Concern Present (7/17/2024)    Received from [facility]    [place] [employer] - Occupational Stress Questionnaire     Feeling of Stress : Rather much   Social Connections: Socially Integrated (7/17/2024)    Received from [facility]    Social Connection and Isolation Panel [NHANES]     Frequency of Communication with Friends and Family: More than three times a week     Frequency of Social Gatherings with Friends and Family: More than three times a week     Attends Islam Services: More than 4 times per year     Active Member of Clubs or Organizations: Yes     Attends Club or Organization Meetings: More than 4 times per year     Marital Status:    Housing Stability: High Risk (6/12/2024)    Received from [facility]    Housing Stability Vital Sign     Unable to Pay for Housing in the Last Year: Yes     Number of Places Lived in the Last Year: 1     Unstable Housing in the Last Year: No                  Physical Exam     ED Triage Vitals [10/05/24 1253]   BP (!) 135/106   Pulse 76   Resp 18   Temp 99.3 °F (37.4 °C)   Temp src Oral   SpO2 97 %   O2 Device None (Room air)       Current Vitals:   Vital Signs  BP: (!) 135/106  Pulse: 76  Resp: 18  Temp: 99.3 °F (37.4 °C)  Temp src: Oral    Oxygen Therapy  SpO2: 97 %  O2 Device: None (Room air)        Physical Exam  Vitals and nursing note reviewed.   Constitutional:       Appearance: She  is well-developed.   HENT:      Head: Normocephalic and atraumatic.   Eyes:      Extraocular Movements: Extraocular movements intact.   Cardiovascular:      Rate and Rhythm: Normal rate and regular rhythm.      Heart sounds: Normal heart sounds.   Pulmonary:      Effort: Pulmonary effort is normal.      Breath sounds: Normal breath sounds.   Abdominal:      General: There is no distension.      Palpations: Abdomen is soft.      Tenderness: There is no abdominal tenderness.   Musculoskeletal:         General: Normal range of motion.      Cervical back: Normal range of motion.      Right lower leg: No edema.      Left lower leg: No edema.   Skin:     General: Skin is warm.      Capillary Refill: Capillary refill takes less than 2 seconds.   Neurological:      General: No focal deficit present.      Mental Status: She is alert.      Cranial Nerves: No cranial nerve deficit.      Motor: No weakness.      Comments: No focal deficits       Differential diagnosis includes but is not limited to COPD exacerbation, ACS/MI, PE, pulmonary edema, CHF, viral syndrome    ED Course     Labs Reviewed   BASIC METABOLIC PANEL (8) - Abnormal; Notable for the following components:       Result Value    Glucose 207 (*)     Calculated Osmolality 302 (*)     All other components within normal limits   URINALYSIS WITH CULTURE REFLEX - Abnormal; Notable for the following components:    Urobilinogen Urine 2 (*)     All other components within normal limits   TROPONIN I HIGH SENSITIVITY - Normal   PRO BETA NATRIURETIC PEPTIDE - Normal   D-DIMER - Normal   SARS-COV-2/FLU A AND B/RSV BY PCR (GENEXPERT) - Normal    Narrative:     This test is intended for the qualitative detection and differentiation of SARS-CoV-2, influenza A, influenza B, and respiratory syncytial virus (RSV) viral RNA in nasopharyngeal or nares swabs from individuals suspected of respiratory viral infection consistent with COVID-19 by their healthcare provider. Signs and  symptoms of respiratory viral infection due to SARS-CoV-2, influenza, and RSV can be similar.    Test performed using the Xpert Xpress SARS-CoV-2/FLU/RSV (real time RT-PCR)  assay on the Exavio instrument, [employer] / [place] [address].   This test is being used under the Food and Drug Administration's Emergency Use Authorization.    The authorized Fact Sheet for Healthcare Providers for this assay is available upon request from the laboratory.   CBC WITH DIFFERENTIAL WITH PLATELET     EKG    Rate, intervals and axes as noted on EKG Report.  Rate: 76  Rhythm: Sinus Rhythm  Reading: No STEMI, possible left atrial enlargement,            ED Course as of 10/05/24 1607  ------------------------------------------------------------  Time: 10/05 1411  Comment: Independent interpretation of chest x-ray, no clear pneumonia  ------------------------------------------------------------  Time: 10/05 1411  Comment: UA negative for UTI or hematuria  ------------------------------------------------------------  Time: 10/05 1411  Comment: CBC unremarkable       XR CHEST AP PORTABLE  (CPT=71045)    Result Date: 10/5/2024  CONCLUSION: No acute cardiopulmonary disease.    Dictated by (CST): Johny Martino MD on 10/05/2024 at 2:08 PM     Finalized by (CST): Johny Martino MD on 10/05/2024 at 2:09 PM                MDM                          Medical Decision Making  Vitals are stable in the ED.  Patient in no acute distress.  Labs reviewed as above.  Additionally high-sensitivity troponin and proBNP within normal range.  D-dimer within normal limits and with low pretest probability for PE or dissection, would not pursue advanced imaging.  Urinalysis unremarkable.  Per my independent interpretation of chest x-ray, no clear pneumonia.  Suspect URI and COPD exacerbation.  Will treat with short course of prednisone, and albuterol as needed.  PCP follow-up advised and she is agreeable with plan.    Problems Addressed:  COPD  exacerbation (HCC): complicated acute illness or injury with systemic symptoms    Amount and/or Complexity of Data Reviewed  Labs: ordered. Decision-making details documented in ED Course.  Radiology: ordered and independent interpretation performed. Decision-making details documented in ED Course.  ECG/medicine tests: ordered and independent interpretation performed. Decision-making details documented in ED Course.    Risk  Prescription drug management.  Decision regarding hospitalization.  Risk Details: No clear indication for hospitalization at this time        Disposition and Plan     Clinical Impression:  1. COPD exacerbation (HCC)         Disposition:  Discharge  10/5/2024  2:55 pm    Follow-up:  Vera Calderon MD  [address] [place] [id number] [phone]    Follow up in 1 week(s)      We recommend that you schedule follow up care with a primary care provider within the next three months to obtain basic health screening including reassessment of your blood pressure.      Medications Prescribed:  Discharge Medication List as of 10/5/2024  3:04 PM        START taking these medications    Details   albuterol 108 (90 Base) MCG/ACT Inhalation Aero Soln Inhale 2 puffs into the lungs every 4 (four) hours as needed for Wheezing., Normal, Disp-1 each, R-0      predniSONE 20 MG Oral Tab Take 2 tablets (40 mg total) by mouth daily for 5 days., Normal, Disp-10 tablet, R-0                 Supplementary Documentation:

## (undated) NOTE — IP AVS SNAPSHOT
Patient Demographics     Address  [address] [place] [address] Phone  [phone] [facility])  [phone] [facility]      Emergency Contact(s)     Name Relation Home Work C/ Stewart Montgomery [address], Ab White Daughter [phone]      Allergies as of 10/1 carvedilol 12.5 MG Tabs  Commonly known as:  COREG  Next dose due: Tonight       Take 1 tablet (12.5 mg total) by mouth 2 (two) times daily with meals.    Carl Tee MD         ergocalciferol 74558 units Caps  Commonly known as:  DRISDOL/VIT [id number] amLODIPine Besylate (NORVASC) tab 10 mg 10/10/19 0847 Given      [id number] apixaban (ELIQUIS) tab 5 mg 10/09/19 2029 Given      [id number] apixaban (ELIQUIS) tab 5 mg 10/10/19 0847 Given      [id number] aspirin chewable tab 81 mg 10/10/19 0847 Giv Ampicillin 4  Sensitive    Cefazolin <=4  Sensitive    Ciprofloxacin <=0.25  Sensitive    Gentamicin <=1  Sensitive    Levofloxacin <=0.12  Sensitive    Meropenem <=0.25  Sensitive    Nitrofurantoin <=16  Sensitive    Piperacillin + Tazobactam <=4  Sensit SOCIAL HISTORY:  Denies any history of smoking, alcohol, or drugs. REVIEW OF SYSTEMS:  Denies any chest pain, palpitations. Denies any abdominal pain, nausea, vomiting. PHYSICAL EXAMINATION:    GENERAL:  Awake and alert, not in acute distress. Consults - MD Consult Notes      Consults signed by Andrea Campos MD at 10/9/2019  8:05 PM      Author:  Andrea Campos MD Service:  Silvia Cotter Type:  Physician    Filed:  10/9/2019  8:05 PM Status:  Signed    :  Andrea Campos MD (Physician) through XII are normal.  Moderate to marked right hemiparesis. Strength in left arm and left leg is normal.  No carotid bruits. Deep tendon reflexes increased in right arm and right leg with right Babinski sign.   Joint position sense of hands and feet is Principal Problem:    Cerebrovascular accident (CVA) due to occlusion of precerebral artery ([address]  Active Problems:     Morbid obesity (HCC)[TL.2]      PHYSICAL THERAPY ASSESSMENT     Patient received supine in bed; agreeable to participate in therapy on th SUBJECTIVE  \"I feel discouraged\"    OBJECTIVE[TL.1]  Precautions: Bed/chair alarm[TL.2]    WEIGHT BEARING RESTRICTION[TL.1]  Weight Bearing Restriction: None[TL.2]                PAIN ASSESSMENT[TL.1]   Ratin  Location: R groin  Management Techniques aware of session/findings; Alarm set[TL. 2]    CURRENT GOALS   Goals to be met by: 10/22/19  Patient Goal Patient's self-stated goal is: Improve mobility   Goal #1 Patient is able to demonstrate supine - sit EOB @ level: minimum assistance      Goal #1   Cur Patient is a[TL.3 62year old[TL.2] female admitted 10/7/2019 for R arm pain, lower lip and tongue numbness and L sided weakness.  MRI and CT imaging of brain: Age indeterminate lacunar infarct involving the left thalamus which appears new since prior MR o The patient's[TL.1] Approx Degree of Impairment: 68.66%[TL.2] has been calculated based on documentation in the [facility] '6 clicks' Inpatient Basic Mobility Short Form.   Research supports that patients with this level of impairment may benefit from acute reha Weight Bearing Restriction: None[TL.2]                PAIN ASSESSMENT[TL.1]  Rating: Unable to rate  Location: R groin  Management Techniques: Activity promotion; Body mechanics;Repositioning[TL. 2]    COGNITION  · Overall Cognitive Status:  WFL - within fun -   Need to walk in hospital room?: A Lot   -   Climbing 3-5 steps with a railing?: Total[TL. 2]     AM-PAC Score:[TL.1]  Raw Score: 12   Approx Degree of Impairment: 68.66%   Standardized Score (AM-PAC Scale): 35.33   CMS Modifier (G-Code): CL[TL.2]    [facility] Physical Therapy Note signed by Yen Coello PT at 10/8/2019  9:28 AM  Version 1 of 2    Author:  Yen Coello PT Service:  Physical Medicine and Rehabilitation Author Type:  Physical Therapist    Filed:  10/8/2019  9:28 AM Date of Service:  10/8 movements- due to poor RUE control- assist with hand on hand placement RUE on walker. Patient tolerated 2 minutes static standing with bilateral UE support on RW- assist to maintain R hand grasp on walker.  Balance requiring Mod A x 1- cues for patient to l Past Surgical History[TL.1]  Past Surgical History:   Procedure Laterality Date   • REMOVAL GALLBLADDER     • TOTAL ABDOM HYSTERECTOMY[TL.2]         HOME SITUATION[TL.1]  Type of Home: House   Home Layout: One level  Stairs to Enter : 5  Railing:  Yes Pulse: 72  Heart Rate Source: Monitor[TL.2]                   O2 WALK[TL.1]     SPO2 Ambulation on Room Air: 97[TL. 2]            AM-PAC '6-Clicks' INPATIENT SHORT FORM - BASIC MOBILITY  How much difficulty does the patient currently have. ..[TL.1]  -   Turn Chair/Wheelchair at assistance level: moderate assistance with walker - rolling     Goal #2  Current Status    Goal #3 Patient is able to ambulate 25 feet with assist device: walker - rolling at assistance level: moderate assistance   Goal #3   Current Sta with chair follow; VSS throughout; patient tolerated well; is motivated to transition to rehab. .  The patient's Approx Degree of Impairment: 56.46% has been calculated based on documentation in the [facility] '6 clicks' Inpatient Daily Activity Short Form.   Res aware of session/findings; All patient questions and concerns addressed; Family present    OT Goals:      T Goals  Patients self stated goal is: to return to PLOF, to go to rehab nd improve     Patient will complete functional transfer with mod A 1x  Comment demo ability to use MIGDALIA UEs to pull with rail) Provide mod A to position at EOB. Pt demo good unsupported sit balance at EOB.    Provide mod A x2 for sit to stand from bed with support of R/W- RT knee buckling significantly, RT hand able to maintain  o • Diabetes [facility])    • Essential hypertension    • Pulmonary embolism (HCC)    • Stroke [facility])        Past Surgical History  Past Surgical History:   Procedure Laterality Date   • REMOVAL GALLBLADDER     • TOTAL ABDOM HYSTERECTOMY         HOME SITUATION  Typ -   Putting on and taking off regular upper body clothing?: A Lot  -   Taking care of personal grooming such as brushing teeth?: A Little  -   Eating meals?: A Little    AM-PAC Score:  Score: 14  Approx Degree of Impairment: 59.67%  Standardized Score (AM- Filed:  10/9/2019 11:42 AM Date of Service:  10/9/2019 11:31 AM Status:  Signed    :  Kash Maravilla (Speech and Language Pathologist)       SPEECH DAILY NOTE - INPATIENT    ASSESSMENT & PLAN   ASSESSMENT  Pt seen for dysphagia tx to assess toleranc No straws, Upright 90 degrees with no feeding assistance 90 % of the time across 1-2 sessions.     In Progress         FOLLOW UP  Follow Up Needed: Yes  SLP Follow-up Date: 10/09/19  Number of Visits to Meet Established Goals: 4    Session: 1    If you hav

## (undated) NOTE — IP AVS SNAPSHOT
[facility]            (For Outpatient Use Only) Initial Admit Date: 10/7/2019   Inpt/Obs Admit Date: Inpt: 10/7/19 / Obs: N/A   Discharge Date:    Crys Colon:  [de-identified]   MRN: [de-identified]   CSN: [id number]   CEID: [id number]        IGD Group Number:  Insurance Type:    Subscriber Name:  Subscriber :    Subscriber ID:  Pt Rel to Subscriber:    Hospital Account Financial Class: Medicare    October 10, 2019